# Patient Record
Sex: MALE | Race: BLACK OR AFRICAN AMERICAN | NOT HISPANIC OR LATINO | Employment: FULL TIME | URBAN - METROPOLITAN AREA
[De-identification: names, ages, dates, MRNs, and addresses within clinical notes are randomized per-mention and may not be internally consistent; named-entity substitution may affect disease eponyms.]

---

## 2022-07-06 ENCOUNTER — HOSPITAL ENCOUNTER (EMERGENCY)
Facility: HOSPITAL | Age: 22
Discharge: HOME | End: 2022-07-07
Attending: INTERNAL MEDICINE

## 2022-07-06 VITALS
HEART RATE: 66 BPM | RESPIRATION RATE: 16 BRPM | WEIGHT: 112.63 LBS | BODY MASS INDEX: 17.07 KG/M2 | TEMPERATURE: 98 F | OXYGEN SATURATION: 99 % | DIASTOLIC BLOOD PRESSURE: 74 MMHG | SYSTOLIC BLOOD PRESSURE: 118 MMHG | HEIGHT: 68 IN

## 2022-07-06 DIAGNOSIS — R52 BODY ACHES: ICD-10-CM

## 2022-07-06 DIAGNOSIS — B34.9 VIRAL ILLNESS: Primary | ICD-10-CM

## 2022-07-06 DIAGNOSIS — R11.0 NAUSEA: ICD-10-CM

## 2022-07-06 LAB
FLUAV AG UPPER RESP QL IA.RAPID: NOT DETECTED
FLUBV AG UPPER RESP QL IA.RAPID: NOT DETECTED
SARS-COV-2 RNA RESP QL NAA+PROBE: NOT DETECTED

## 2022-07-06 PROCEDURE — 99284 EMERGENCY DEPT VISIT MOD MDM: CPT | Mod: 25

## 2022-07-06 PROCEDURE — 87636 SARSCOV2 & INF A&B AMP PRB: CPT | Performed by: INTERNAL MEDICINE

## 2022-07-07 NOTE — ED PROVIDER NOTES
Encounter Date: 7/6/2022       History     Chief Complaint   Patient presents with    Generalized Body Aches     C/o body aches, headache, and diarrhea since yesterday.      22-year-old black male presents to the emergency department complaining of generalized malaise and body aches        Review of patient's allergies indicates:  No Known Allergies  No past medical history on file.  No past surgical history on file.  No family history on file.  Social History     Tobacco Use    Smoking status: Current Every Day Smoker     Types: Vaping with nicotine    Smokeless tobacco: Never Used   Substance Use Topics    Alcohol use: Never    Drug use: Never     Review of Systems   Constitutional: Positive for chills and fatigue. Negative for activity change, appetite change, diaphoresis, fever and unexpected weight change.   HENT: Negative.  Negative for congestion, dental problem, drooling, ear discharge, ear pain, facial swelling, hearing loss, mouth sores, nosebleeds, postnasal drip, rhinorrhea, sinus pressure, sinus pain, sneezing, sore throat, tinnitus, trouble swallowing and voice change.    Eyes: Negative.  Negative for photophobia, pain, discharge, redness, itching and visual disturbance.   Respiratory: Negative.  Negative for apnea, cough, choking, chest tightness, shortness of breath, wheezing and stridor.    Cardiovascular: Negative.  Negative for chest pain, palpitations and leg swelling.   Gastrointestinal: Negative.  Negative for abdominal distention, abdominal pain, anal bleeding, blood in stool, constipation, diarrhea, nausea, rectal pain and vomiting.   Endocrine: Negative.  Negative for cold intolerance, heat intolerance, polydipsia, polyphagia and polyuria.   Genitourinary: Negative.  Negative for decreased urine volume, difficulty urinating, dysuria, enuresis, flank pain, frequency, genital sores, hematuria, penile discharge, penile pain, penile swelling, scrotal swelling, testicular pain and urgency.    Musculoskeletal: Positive for myalgias. Negative for arthralgias, back pain, gait problem, joint swelling, neck pain and neck stiffness.   Skin: Negative.  Negative for color change, pallor, rash and wound.   Allergic/Immunologic: Negative.  Negative for environmental allergies, food allergies and immunocompromised state.   Neurological: Negative.  Negative for dizziness, tremors, seizures, syncope, facial asymmetry, speech difficulty, weakness, light-headedness, numbness and headaches.   Hematological: Negative.  Negative for adenopathy. Does not bruise/bleed easily.   Psychiatric/Behavioral: Negative.  Negative for agitation, behavioral problems, confusion, decreased concentration, dysphoric mood, hallucinations, self-injury, sleep disturbance and suicidal ideas. The patient is not nervous/anxious and is not hyperactive.    All other systems reviewed and are negative.      Physical Exam     Initial Vitals [07/06/22 2145]   BP Pulse Resp Temp SpO2   118/74 66 16 98.2 °F (36.8 °C) 99 %      MAP       --         Physical Exam    Nursing note and vitals reviewed.  Constitutional: He appears well-developed and well-nourished.   HENT:   Head: Normocephalic and atraumatic.   Eyes: Conjunctivae and EOM are normal. Pupils are equal, round, and reactive to light.   Neck: Neck supple.   Normal range of motion.  Cardiovascular: Normal rate and regular rhythm.   Pulmonary/Chest: Breath sounds normal.   Abdominal: Abdomen is soft. Bowel sounds are normal.   Musculoskeletal:         General: Normal range of motion.      Cervical back: Normal range of motion and neck supple.     Neurological: He is alert and oriented to person, place, and time.   Skin: Skin is warm and dry. Capillary refill takes less than 2 seconds.   Psychiatric: He has a normal mood and affect. His behavior is normal. Judgment and thought content normal.         ED Course   Procedures  Labs Reviewed   COVID/FLU A&B PCR - Normal          Imaging Results           X-Ray Abdomen Flat And Erect (Preliminary result)  Result time 07/07/22 01:18:02    ED Interpretation by Hardeep Hutchinson MD (07/07/22 01:18:02, [facility] - Emergency Dept, Emergency Medicine)    Nonspecific bowel gas pattern with no evidence of obstruction or perforation                             X-Ray Chest PA And Lateral (Preliminary result)  Result time 07/07/22 01:18:11    ED Interpretation by Hardeep Hutchinson MD (07/07/22 01:18:11, [facility] - Emergency Dept, Emergency Medicine)    No acute cardiopulmonary changes noted, no infiltrates                               Medications - No data to display                       Clinical Impression:   Final diagnoses:  [R52] Body aches  [R11.0] Nausea  [B34.9] Viral illness (Primary)          ED Disposition Condition    Discharge Stable        ED Prescriptions     None        Follow-up Information     Follow up With Specialties Details Why Contact Info    Primary care physician  In 1 week          Courtney Jo is a certified MA and was present during the entire interaction with this patient     Hardeep Hutchinson MD  07/07/22 0119

## 2023-01-18 ENCOUNTER — HOSPITAL ENCOUNTER (EMERGENCY)
Facility: HOSPITAL | Age: 23
Discharge: HOME | End: 2023-01-19
Attending: INTERNAL MEDICINE
Payer: COMMERCIAL

## 2023-01-18 DIAGNOSIS — A08.4 VIRAL GASTROENTERITIS: Primary | ICD-10-CM

## 2023-01-18 DIAGNOSIS — R11.2 NAUSEA AND VOMITING, UNSPECIFIED VOMITING TYPE: ICD-10-CM

## 2023-01-18 LAB
APPEARANCE UR: ABNORMAL
BILIRUB UR QL STRIP.AUTO: NEGATIVE MG/DL
COLOR UR AUTO: ABNORMAL
GLUCOSE UR QL STRIP.AUTO: NEGATIVE MG/DL
KETONES UR QL STRIP.AUTO: 15 MG/DL
LEUKOCYTE ESTERASE UR QL STRIP.AUTO: NEGATIVE UNIT/L
NITRITE UR QL STRIP.AUTO: NEGATIVE
PH UR STRIP.AUTO: 7 [PH]
PROT UR QL STRIP.AUTO: ABNORMAL MG/DL
RBC UR QL AUTO: NEGATIVE UNIT/L
SP GR UR STRIP.AUTO: 1.02
UROBILINOGEN UR STRIP-ACNC: 2 MG/DL

## 2023-01-18 PROCEDURE — 82150 ASSAY OF AMYLASE: CPT | Performed by: INTERNAL MEDICINE

## 2023-01-18 PROCEDURE — 81003 URINALYSIS AUTO W/O SCOPE: CPT | Performed by: INTERNAL MEDICINE

## 2023-01-18 PROCEDURE — 85025 COMPLETE CBC W/AUTO DIFF WBC: CPT | Performed by: INTERNAL MEDICINE

## 2023-01-18 PROCEDURE — 80053 COMPREHEN METABOLIC PANEL: CPT | Performed by: INTERNAL MEDICINE

## 2023-01-18 PROCEDURE — 99284 EMERGENCY DEPT VISIT MOD MDM: CPT

## 2023-01-18 PROCEDURE — 83690 ASSAY OF LIPASE: CPT | Performed by: INTERNAL MEDICINE

## 2023-01-18 PROCEDURE — 80307 DRUG TEST PRSMV CHEM ANLYZR: CPT | Performed by: INTERNAL MEDICINE

## 2023-01-18 NOTE — Clinical Note
"Lorne Hilario (Lionel) was seen and treated in our emergency department on 1/18/2023.  He may return to work on 01/20/2023.       If you have any questions or concerns, please don't hesitate to call.       RN    "

## 2023-01-18 NOTE — Clinical Note
"Lorne Hilario (Lionel) was seen and treated in our emergency department on 1/18/2023.  He may return to work on 01/19/2023.       If you have any questions or concerns, please don't hesitate to call.       RN    "

## 2023-01-19 VITALS
DIASTOLIC BLOOD PRESSURE: 76 MMHG | RESPIRATION RATE: 18 BRPM | OXYGEN SATURATION: 100 % | SYSTOLIC BLOOD PRESSURE: 135 MMHG | HEIGHT: 68 IN | WEIGHT: 113.81 LBS | TEMPERATURE: 98 F | HEART RATE: 65 BPM | BODY MASS INDEX: 17.25 KG/M2

## 2023-01-19 LAB
ALBUMIN SERPL-MCNC: 4.9 G/DL (ref 3.5–5)
ALBUMIN/GLOB SERPL: 1.2 RATIO (ref 1.1–2)
ALP SERPL-CCNC: 46 UNIT/L (ref 40–150)
ALT SERPL-CCNC: 19 UNIT/L (ref 0–55)
AMPHET UR QL SCN: NEGATIVE
AMYLASE SERPL-CCNC: 112 UNIT/L (ref 25–125)
AST SERPL-CCNC: 21 UNIT/L (ref 5–34)
BACTERIA #/AREA URNS AUTO: ABNORMAL /HPF
BARBITURATE SCN PRESENT UR: NEGATIVE
BASOPHILS # BLD AUTO: 0.07 X10(3)/MCL (ref 0–0.2)
BASOPHILS NFR BLD AUTO: 1.5 %
BENZODIAZ UR QL SCN: NEGATIVE
BILIRUBIN DIRECT+TOT PNL SERPL-MCNC: 2.1 MG/DL
BUN SERPL-MCNC: 14 MG/DL (ref 8.9–20.6)
CALCIUM SERPL-MCNC: 10 MG/DL (ref 8.4–10.2)
CANNABINOIDS UR QL SCN: POSITIVE
CHLORIDE SERPL-SCNC: 105 MMOL/L (ref 98–107)
CO2 SERPL-SCNC: 21 MMOL/L (ref 22–29)
COCAINE UR QL SCN: NEGATIVE
CREAT SERPL-MCNC: 0.91 MG/DL (ref 0.73–1.18)
EOSINOPHIL # BLD AUTO: 0.12 X10(3)/MCL (ref 0–0.9)
EOSINOPHIL NFR BLD AUTO: 2.6 %
ERYTHROCYTE [DISTWIDTH] IN BLOOD BY AUTOMATED COUNT: 12.4 % (ref 11.5–17)
FENTANYL UR QL SCN: NEGATIVE
GFR SERPLBLD CREATININE-BSD FMLA CKD-EPI: >60 MLS/MIN/1.73/M2
GLOBULIN SER-MCNC: 4 GM/DL (ref 2.4–3.5)
GLUCOSE SERPL-MCNC: 70 MG/DL (ref 74–100)
HCT VFR BLD AUTO: 50 % (ref 42–52)
HGB BLD-MCNC: 16.3 GM/DL (ref 14–18)
IMM GRANULOCYTES # BLD AUTO: 0 X10(3)/MCL (ref 0–0.04)
IMM GRANULOCYTES NFR BLD AUTO: 0 %
LIPASE SERPL-CCNC: 26 U/L
LYMPHOCYTES # BLD AUTO: 1.63 X10(3)/MCL (ref 0.6–4.6)
LYMPHOCYTES NFR BLD AUTO: 35.4 %
MCH RBC QN AUTO: 29 PG
MCHC RBC AUTO-ENTMCNC: 32.6 MG/DL (ref 33–36)
MCV RBC AUTO: 88.8 FL (ref 80–94)
MDMA UR QL SCN: NEGATIVE
MONOCYTES # BLD AUTO: 0.31 X10(3)/MCL (ref 0.1–1.3)
MONOCYTES NFR BLD AUTO: 6.7 %
MUCOUS THREADS URNS QL MICRO: ABNORMAL /LPF
NEUTROPHILS # BLD AUTO: 2.48 X10(3)/MCL (ref 2.1–9.2)
NEUTROPHILS NFR BLD AUTO: 53.8 %
OPIATES UR QL SCN: NEGATIVE
PCP UR QL: NEGATIVE
PH UR: 7 [PH] (ref 3–11)
PLATELET # BLD AUTO: 291 X10(3)/MCL (ref 130–400)
PMV BLD AUTO: 9.9 FL (ref 7.4–10.4)
POTASSIUM SERPL-SCNC: 3.8 MMOL/L (ref 3.5–5.1)
PROT SERPL-MCNC: 8.9 GM/DL (ref 6.4–8.3)
RBC # BLD AUTO: 5.63 X10(6)/MCL (ref 4.7–6.1)
RBC #/AREA URNS AUTO: ABNORMAL /HPF
SODIUM SERPL-SCNC: 137 MMOL/L (ref 136–145)
SPECIFIC GRAVITY, URINE AUTO (.000) (OHS): 1.02 (ref 1–1.03)
SQUAMOUS #/AREA URNS AUTO: ABNORMAL /HPF
WBC # SPEC AUTO: 4.6 X10(3)/MCL (ref 4.5–11.5)
WBC #/AREA URNS AUTO: ABNORMAL /HPF

## 2023-01-19 RX ORDER — ONDANSETRON 4 MG/1
4 TABLET, ORALLY DISINTEGRATING ORAL EVERY 6 HOURS PRN
Qty: 10 TABLET | Refills: 0 | Status: SHIPPED | OUTPATIENT
Start: 2023-01-19 | End: 2023-01-23

## 2023-01-19 NOTE — ED PROVIDER NOTES
Encounter Date: 1/18/2023       History     Chief Complaint   Patient presents with    Abdominal Pain     N/v/d and abdominal pain x3 days. Pt reports being unable to eat without feeling full     22-year-old black male reports a 3 days of nausea vomiting and diarrhea with generalized abdominal pain.  He denies fever or chills and denies any travel or sick contacts    Review of patient's allergies indicates:  No Known Allergies  History reviewed. No pertinent past medical history.  History reviewed. No pertinent surgical history.  History reviewed. No pertinent family history.  Social History     Tobacco Use    Smoking status: Every Day     Types: Vaping with nicotine    Smokeless tobacco: Never   Substance Use Topics    Alcohol use: Never    Drug use: Never     Review of Systems   Constitutional: Negative.  Negative for activity change, appetite change, chills, diaphoresis, fatigue, fever and unexpected weight change.   HENT: Negative.  Negative for congestion, dental problem, drooling, ear discharge, ear pain, facial swelling, hearing loss, mouth sores, nosebleeds, postnasal drip, rhinorrhea, sinus pressure, sinus pain, sneezing, sore throat, tinnitus, trouble swallowing and voice change.    Eyes: Negative.  Negative for photophobia, pain, discharge, redness, itching and visual disturbance.   Respiratory: Negative.  Negative for apnea, cough, choking, chest tightness, shortness of breath, wheezing and stridor.    Cardiovascular: Negative.  Negative for chest pain, palpitations and leg swelling.   Gastrointestinal:  Positive for diarrhea, nausea and vomiting. Negative for abdominal distention, abdominal pain, anal bleeding, blood in stool, constipation and rectal pain.   Endocrine: Negative.  Negative for cold intolerance, heat intolerance, polydipsia, polyphagia and polyuria.   Genitourinary: Negative.  Negative for decreased urine volume, difficulty urinating, dysuria, enuresis, flank pain, frequency, genital  sores, hematuria, penile discharge, penile pain, penile swelling, scrotal swelling, testicular pain and urgency.   Musculoskeletal: Negative.  Negative for arthralgias, back pain, gait problem, joint swelling, myalgias, neck pain and neck stiffness.   Skin: Negative.  Negative for color change, pallor, rash and wound.   Allergic/Immunologic: Negative.  Negative for environmental allergies, food allergies and immunocompromised state.   Neurological: Negative.  Negative for dizziness, tremors, seizures, syncope, facial asymmetry, speech difficulty, weakness, light-headedness, numbness and headaches.   Hematological: Negative.  Negative for adenopathy. Does not bruise/bleed easily.   Psychiatric/Behavioral: Negative.  Negative for agitation, behavioral problems, confusion, decreased concentration, dysphoric mood, hallucinations, self-injury, sleep disturbance and suicidal ideas. The patient is not nervous/anxious and is not hyperactive.    All other systems reviewed and are negative.    Physical Exam     Initial Vitals [01/18/23 2119]   BP Pulse Resp Temp SpO2   118/77 73 18 98.2 °F (36.8 °C) 100 %      MAP       --         Physical Exam    Nursing note and vitals reviewed.  Constitutional: He appears well-developed and well-nourished.   HENT:   Head: Normocephalic and atraumatic.   Eyes: Conjunctivae and EOM are normal. Pupils are equal, round, and reactive to light.   Neck: Neck supple.   Normal range of motion.  Cardiovascular:  Normal rate and regular rhythm.           Pulmonary/Chest: Breath sounds normal.   Abdominal: Abdomen is soft. Bowel sounds are normal.   Musculoskeletal:         General: Normal range of motion.      Cervical back: Normal range of motion and neck supple.     Neurological: He is alert and oriented to person, place, and time.   Skin: Skin is warm and dry. Capillary refill takes less than 2 seconds.   Psychiatric: He has a normal mood and affect. His behavior is normal. Judgment and thought  content normal.       ED Course   Procedures  Admission on 01/18/2023   Component Date Value Ref Range Status    Sodium Level 01/18/2023 137  136 - 145 mmol/L Final    Potassium Level 01/18/2023 3.8  3.5 - 5.1 mmol/L Final    Chloride 01/18/2023 105  98 - 107 mmol/L Final    Carbon Dioxide 01/18/2023 21 (L)  22 - 29 mmol/L Final    Glucose Level 01/18/2023 70 (L)  74 - 100 mg/dL Final    Blood Urea Nitrogen 01/18/2023 14.0  8.9 - 20.6 mg/dL Final    Creatinine 01/18/2023 0.91  0.73 - 1.18 mg/dL Final    Calcium Level Total 01/18/2023 10.0  8.4 - 10.2 mg/dL Final    Protein Total 01/18/2023 8.9 (H)  6.4 - 8.3 gm/dL Final    Albumin Level 01/18/2023 4.9  3.5 - 5.0 g/dL Final    Globulin 01/18/2023 4.0 (H)  2.4 - 3.5 gm/dL Final    Albumin/Globulin Ratio 01/18/2023 1.2  1.1 - 2.0 ratio Final    Bilirubin Total 01/18/2023 2.1 (H)  <=1.5 mg/dL Final    Alkaline Phosphatase 01/18/2023 46  40 - 150 unit/L Final    Alanine Aminotransferase 01/18/2023 19  0 - 55 unit/L Final    Aspartate Aminotransferase 01/18/2023 21  5 - 34 unit/L Final    eGFR 01/18/2023 >60  mls/min/1.73/m2 Final    Color, UA 01/18/2023 Dark Yellow  Yellow, Light-Yellow, Dark Yellow, Sandra, Straw Final    Appearance, UA 01/18/2023 Slightly Cloudy (A)  Clear Final    Specific Gravity, UA 01/18/2023 1.025   Final    pH, UA 01/18/2023 7.0  5.0 - 8.5 Final    Protein, UA 01/18/2023 Trace (A)  Negative mg/dL Final    Glucose, UA 01/18/2023 Negative  Negative, Normal mg/dL Final    Ketones, UA 01/18/2023 15 (A)  Negative mg/dL Final    Blood, UA 01/18/2023 Negative  Negative unit/L Final    Bilirubin, UA 01/18/2023 Negative  Negative mg/dL Final    Urobilinogen, UA 01/18/2023 2.0 (A)  0.2, 1.0, Normal mg/dL Final    Nitrites, UA 01/18/2023 Negative  Negative Final    Leukocyte Esterase, UA 01/18/2023 Negative  Negative unit/L Final    Amphetamines, Urine 01/18/2023 Negative  Negative Final    Barbituates, Urine 01/18/2023 Negative  Negative Final     Benzodiazepine, Urine 01/18/2023 Negative  Negative Final    Cannabinoids, Urine 01/18/2023 Positive (A)  Negative Final    Cocaine, Urine 01/18/2023 Negative  Negative Final    Fentanyl, Urine 01/18/2023 Negative  Negative Final    MDMA, Urine 01/18/2023 Negative  Negative Final    Opiates, Urine 01/18/2023 Negative  Negative Final    Phencyclidine, Urine 01/18/2023 Negative  Negative Final    pH, Urine 01/18/2023 7.0  3.0 - 11.0 Final    Specific Gravity, Urine Auto 01/18/2023 1.025  1.001 - 1.035 Final    Amylase Level 01/18/2023 112  25 - 125 unit/L Final    Lipase Level 01/18/2023 26  <=60 U/L Final    WBC 01/18/2023 4.6  4.5 - 11.5 x10(3)/mcL Final    RBC 01/18/2023 5.63  4.70 - 6.10 x10(6)/mcL Final    Hgb 01/18/2023 16.3  14.0 - 18.0 gm/dL Final    Hct 01/18/2023 50.0  42.0 - 52.0 % Final    MCV 01/18/2023 88.8  80.0 - 94.0 fL Final    MCH 01/18/2023 29.0  pg Final    MCHC 01/18/2023 32.6 (L)  33.0 - 36.0 mg/dL Final    RDW 01/18/2023 12.4  11.5 - 17.0 % Final    Platelet 01/18/2023 291  130 - 400 x10(3)/mcL Final    MPV 01/18/2023 9.9  7.4 - 10.4 fL Final    Neut % 01/18/2023 53.8  % Final    Lymph % 01/18/2023 35.4  % Final    Mono % 01/18/2023 6.7  % Final    Eos % 01/18/2023 2.6  % Final    Basophil % 01/18/2023 1.5  % Final    Lymph # 01/18/2023 1.63  0.6 - 4.6 x10(3)/mcL Final    Neut # 01/18/2023 2.48  2.1 - 9.2 x10(3)/mcL Final    Mono # 01/18/2023 0.31  0.1 - 1.3 x10(3)/mcL Final    Eos # 01/18/2023 0.12  0 - 0.9 x10(3)/mcL Final    Baso # 01/18/2023 0.07  0 - 0.2 x10(3)/mcL Final    IG# 01/18/2023 0.00  0 - 0.04 x10(3)/mcL Final    IG% 01/18/2023 0.0  % Final    Bacteria, UA 01/18/2023 None Seen  None Seen, Rare, Occasional /HPF Final    Mucous, UA 01/18/2023 Moderate (A)  None Seen /LPF Final    RBC, UA 01/18/2023 0-2  None Seen, 0-2, 3-5, 0-5 /HPF Final    WBC, UA 01/18/2023 None Seen  None Seen, 0-2, 3-5, 0-5 /HPF Final    Squamous Epithelial Cells, UA 01/18/2023 Rare  None Seen, Rare,  Occasional, Occ /HPF Final       Labs Reviewed   COMPREHENSIVE METABOLIC PANEL - Abnormal; Notable for the following components:       Result Value    Carbon Dioxide 21 (*)     Glucose Level 70 (*)     Protein Total 8.9 (*)     Globulin 4.0 (*)     Bilirubin Total 2.1 (*)     All other components within normal limits   URINALYSIS, REFLEX TO URINE CULTURE - Abnormal; Notable for the following components:    Appearance, UA Slightly Cloudy (*)     Protein, UA Trace (*)     Ketones, UA 15 (*)     Urobilinogen, UA 2.0 (*)     All other components within normal limits   DRUG SCREEN, URINE (BEAKER) - Abnormal; Notable for the following components:    Cannabinoids, Urine Positive (*)     All other components within normal limits    Narrative:     Cut off concentrations:    Amphetamines - 1000 ng/ml  Barbiturates - 200 ng/ml  Benzodiazepine - 200 ng/ml  Cannabinoids (THC) - 50 ng/ml  Cocaine - 300 ng/ml  Fentanyl - 1.0 ng/ml  MDMA - 500 ng/ml  Opiates - 300 ng/ml   Phencyclidine (PCP) - 25 ng/ml    Specimen submitted for drug analysis and tested for pH and specific gravity in order to evaluate sample integrity. Suspect tampering if specific gravity is <1.003 and/or pH is not within the range of 4.5 - 8.0  False negatives may result form substances such as bleach added to urine.  False positives may result for the presence of a substance with similar chemical structure to the drug or its metabolite.    This test provides only a PRELIMINARY analytical test result. A more specific alternate chemical method must be used in order to obtain a confirmed analytical result. Gas chromatography/mass spectrometry (GC/MS) is the preferred confirmatory method. Other chemical confirmation methods are available. Clinical consideration and professional judgement should be applied to any drug of abuse test result, particularly when preliminary positive results are used.    Positive results will be confirmed only at the physicians request.  Unconfirmed screening results are to be used only for medical purposes (treatment).        CBC WITH DIFFERENTIAL - Abnormal; Notable for the following components:    MCHC 32.6 (*)     All other components within normal limits   URINALYSIS, MICROSCOPIC - Abnormal; Notable for the following components:    Mucous, UA Moderate (*)     All other components within normal limits   AMYLASE - Normal   LIPASE - Normal   CBC W/ AUTO DIFFERENTIAL    Narrative:     The following orders were created for panel order CBC auto differential.  Procedure                               Abnormality         Status                     ---------                               -----------         ------                     CBC with Differential[[id number]]        Abnormal            Final result                 Please view results for these tests on the individual orders.          Imaging Results              X-Ray Abdomen Flat And Erect (Preliminary result)  Result time 01/19/23 00:45:11      ED Interpretation by Hardeep Hutchinson MD (01/19/23 00:45:11, [facility] - Emergency Dept, Emergency Medicine)    Nonspecific bowel gas pattern with no evidence of obstruction or perforation                                     Medications - No data to display                    Medical Decision Making  22-year-old black male with no past medical history presents emergency department with 3 days of nausea vomiting and diarrhea.  He states the abdominal pain is intermittent in character and generalized around his periumbilical area.  He denies any fever or sick contacts or recent travel.  He is currently not nauseated but states at times he will get waves of nausea that will lead to him dry heaving.  Workup was done which revealed normal lab work and a normal x-ray and his clinical picture is consistent with a viral gastroenteritis so I reassured him he is not toxic looking in his afebrile and will send a script for Zofran to the pharmacy for  him    Problems Addressed:  Nausea and vomiting, unspecified vomiting type: acute illness or injury  Viral gastroenteritis: acute illness or injury    Amount and/or Complexity of Data Reviewed  Labs: ordered. Decision-making details documented in ED Course.  Radiology: ordered and independent interpretation performed. Decision-making details documented in ED Course.    Risk  OTC drugs.  Prescription drug management.  Parenteral controlled substances.  Decision regarding hospitalization.  Diagnosis or treatment significantly limited by social determinants of health.            Clinical Impression:   Final diagnoses:  [A08.4] Viral gastroenteritis (Primary)  [R11.2] Nausea and vomiting, unspecified vomiting type        ED Disposition Condition    Discharge Stable          ED Prescriptions       Medication Sig Dispense Start Date End Date Auth. Provider    ondansetron (ZOFRAN-ODT) 4 MG TbDL Take 1 tablet (4 mg total) by mouth every 6 (six) hours as needed (Nausea). 10 tablet 1/19/2023 1/23/2023 Hardeep Hutchinson MD          Follow-up Information       Follow up With Specialties Details Why Contact Info    Primary care physician  In 3 days            Courtney Jo is a certified MA and was present during the entire interaction with this patient      Hardeep Hutchinson MD  01/19/23 0045

## 2023-01-20 DIAGNOSIS — Z00.00 WELLNESS EXAMINATION: Primary | ICD-10-CM

## 2023-07-05 ENCOUNTER — PATIENT MESSAGE (OUTPATIENT)
Dept: RESEARCH | Facility: HOSPITAL | Age: 23
End: 2023-07-05
Payer: COMMERCIAL

## 2023-10-02 ENCOUNTER — HOSPITAL ENCOUNTER (EMERGENCY)
Facility: HOSPITAL | Age: 23
Discharge: HOME | End: 2023-10-02
Attending: INTERNAL MEDICINE
Payer: COMMERCIAL

## 2023-10-02 VITALS
TEMPERATURE: 98 F | BODY MASS INDEX: 16.88 KG/M2 | DIASTOLIC BLOOD PRESSURE: 72 MMHG | OXYGEN SATURATION: 99 % | RESPIRATION RATE: 20 BRPM | HEART RATE: 70 BPM | SYSTOLIC BLOOD PRESSURE: 111 MMHG | WEIGHT: 111 LBS

## 2023-10-02 DIAGNOSIS — A08.4 VIRAL GASTROENTERITIS: Primary | ICD-10-CM

## 2023-10-02 DIAGNOSIS — R51.9 NONINTRACTABLE HEADACHE, UNSPECIFIED CHRONICITY PATTERN, UNSPECIFIED HEADACHE TYPE: ICD-10-CM

## 2023-10-02 PROCEDURE — 25000003 PHARM REV CODE 250: Performed by: NURSE PRACTITIONER

## 2023-10-02 PROCEDURE — 63600175 PHARM REV CODE 636 W HCPCS: Performed by: NURSE PRACTITIONER

## 2023-10-02 PROCEDURE — 99284 EMERGENCY DEPT VISIT MOD MDM: CPT

## 2023-10-02 PROCEDURE — 96372 THER/PROPH/DIAG INJ SC/IM: CPT | Performed by: NURSE PRACTITIONER

## 2023-10-02 PROCEDURE — 0240U COVID/FLU A&B PCR: CPT | Performed by: NURSE PRACTITIONER

## 2023-10-02 RX ORDER — ONDANSETRON 4 MG/1
4 TABLET, ORALLY DISINTEGRATING ORAL
Status: COMPLETED | OUTPATIENT
Start: 2023-10-02 | End: 2023-10-02

## 2023-10-02 RX ORDER — ONDANSETRON 4 MG/1
4 TABLET, ORALLY DISINTEGRATING ORAL EVERY 6 HOURS PRN
Qty: 20 TABLET | Refills: 0 | Status: SHIPPED | OUTPATIENT
Start: 2023-10-02 | End: 2023-10-07

## 2023-10-02 RX ORDER — KETOROLAC TROMETHAMINE 30 MG/ML
30 INJECTION, SOLUTION INTRAMUSCULAR; INTRAVENOUS
Status: COMPLETED | OUTPATIENT
Start: 2023-10-02 | End: 2023-10-02

## 2023-10-02 RX ORDER — IBUPROFEN 600 MG/1
600 TABLET ORAL
Status: DISCONTINUED | OUTPATIENT
Start: 2023-10-02 | End: 2023-10-02

## 2023-10-02 RX ADMIN — KETOROLAC TROMETHAMINE 30 MG: 30 INJECTION, SOLUTION INTRAMUSCULAR; INTRAVENOUS at 07:10

## 2023-10-02 RX ADMIN — ONDANSETRON 4 MG: 4 TABLET, ORALLY DISINTEGRATING ORAL at 07:10

## 2023-10-02 NOTE — Clinical Note
"Lorne Hilario (Lionel) was seen and treated in our emergency department on 10/2/2023.  He may return to work on 10/04/2023.       If you have any questions or concerns, please don't hesitate to call.       RN    "

## 2023-10-03 NOTE — ED PROVIDER NOTES
Encounter Date: 10/2/2023       History     Chief Complaint   Patient presents with    Headache     States began yesterday with L sided headache, nausea, and abdominal cramping. No meds taken PTA     See MDM    The history is provided by the patient. No  was used.     Review of patient's allergies indicates:  No Known Allergies  No past medical history on file.  No past surgical history on file.  No family history on file.  Social History     Tobacco Use    Smoking status: Every Day     Types: Vaping with nicotine    Smokeless tobacco: Never   Substance Use Topics    Alcohol use: Never    Drug use: Never     Review of Systems   Respiratory:  Positive for cough.    Gastrointestinal:  Positive for abdominal pain, diarrhea and nausea.   Neurological:  Positive for headaches.   All other systems reviewed and are negative.      Physical Exam     Initial Vitals [10/02/23 1848]   BP Pulse Resp Temp SpO2   118/78 77 16 98 °F (36.7 °C) 99 %      MAP       --         Physical Exam    Nursing note and vitals reviewed.  Constitutional: He appears well-developed and well-nourished.   HENT:   Right Ear: Tympanic membrane and ear canal normal.   Left Ear: Tympanic membrane and ear canal normal.   Mouth/Throat: No posterior oropharyngeal edema or posterior oropharyngeal erythema.   Eyes: Conjunctivae are normal.   Cardiovascular:  Normal rate, regular rhythm and normal heart sounds.           Pulmonary/Chest: Breath sounds normal. No respiratory distress.   Abdominal: Abdomen is soft. Bowel sounds are normal. He exhibits no distension. There is no abdominal tenderness.   Musculoskeletal:         General: Normal range of motion.     Neurological: He is alert and oriented to person, place, and time.   Skin: Skin is warm and dry.   Psychiatric: He has a normal mood and affect.         ED Course   Procedures  Labs Reviewed   COVID/FLU A&B PCR - Normal    Narrative:     The Xpert Xpress SARS-CoV-2/FLU/RSV plus is a  rapid, multiplexed real-time PCR test intended for the simultaneous qualitative detection and differentiation of SARS-CoV-2, Influenza A, Influenza B, and respiratory syncytial virus (RSV) viral RNA in either nasopharyngeal swab or nasal swab specimens.                Imaging Results    None          Medications   ondansetron disintegrating tablet 4 mg (4 mg Oral Given 10/2/23 1907)   ketorolac injection 30 mg (30 mg Intramuscular Given 10/2/23 1907)     Medical Decision Making  24 y/o male presents with cough, headache, abdominal cramping with nausea and 5 episodes of diarrhea today. No fever. No known sick contacts.     Covid/flu negative. Toradol given and helping he states headache and abdominal cramping improved. Discussed likely viral. Will give nausea meds and let him go home. [place] diet. Hydrate.     Amount and/or Complexity of Data Reviewed  Labs: ordered. Decision-making details documented in ED Course.    Risk  Prescription drug management.      Additional MDM:   Differential Diagnosis:   Other: The following diagnoses were also considered and will be evaluated: covid, gastroenteritis and flu.                            Clinical Impression:   Final diagnoses:  [A08.4] Viral gastroenteritis (Primary)  [R51.9] Nonintractable headache, unspecified chronicity pattern, unspecified headache type        ED Disposition Condition    Discharge Stable          ED Prescriptions       Medication Sig Dispense Start Date End Date Auth. Provider    ondansetron (ZOFRAN-ODT) 4 MG TbDL Take 1 tablet (4 mg total) by mouth every 6 (six) hours as needed (nausea/vomiting). 20 tablet 10/2/2023 10/7/2023 Estella Peralta FNP          Follow-up Information       Follow up With Specialties Details Why Contact Info    primary care provider  Call in 1 week As needed, If symptoms worsen              Estella Peralta FNP  10/02/23 2006

## 2023-11-13 ENCOUNTER — HOSPITAL ENCOUNTER (EMERGENCY)
Facility: HOSPITAL | Age: 23
Discharge: HOME | End: 2023-11-13
Attending: EMERGENCY MEDICINE
Payer: COMMERCIAL

## 2023-11-13 VITALS
HEART RATE: 82 BPM | TEMPERATURE: 98 F | SYSTOLIC BLOOD PRESSURE: 130 MMHG | RESPIRATION RATE: 18 BRPM | DIASTOLIC BLOOD PRESSURE: 79 MMHG | WEIGHT: 118.19 LBS | OXYGEN SATURATION: 99 % | BODY MASS INDEX: 17.97 KG/M2

## 2023-11-13 DIAGNOSIS — T78.1XXA ALLERGIC REACTION TO FOOD, INITIAL ENCOUNTER: Primary | ICD-10-CM

## 2023-11-13 PROCEDURE — 99284 EMERGENCY DEPT VISIT MOD MDM: CPT

## 2023-11-13 PROCEDURE — 96372 THER/PROPH/DIAG INJ SC/IM: CPT | Performed by: NURSE PRACTITIONER

## 2023-11-13 PROCEDURE — 63600175 PHARM REV CODE 636 W HCPCS: Performed by: NURSE PRACTITIONER

## 2023-11-13 RX ORDER — LEVOCETIRIZINE DIHYDROCHLORIDE 5 MG/1
5 TABLET, FILM COATED ORAL NIGHTLY
Qty: 30 TABLET | Refills: 0 | Status: SHIPPED | OUTPATIENT
Start: 2023-11-13 | End: 2023-12-13

## 2023-11-13 RX ORDER — EPINEPHRINE 0.3 MG/.3ML
1 INJECTION SUBCUTANEOUS ONCE
Qty: 0.3 ML | Refills: 0 | Status: SHIPPED | OUTPATIENT
Start: 2023-11-13 | End: 2023-11-13

## 2023-11-13 RX ORDER — DIPHENHYDRAMINE HYDROCHLORIDE 50 MG/ML
25 INJECTION INTRAMUSCULAR; INTRAVENOUS
Status: COMPLETED | OUTPATIENT
Start: 2023-11-13 | End: 2023-11-13

## 2023-11-13 RX ADMIN — DIPHENHYDRAMINE HYDROCHLORIDE 25 MG: 50 INJECTION INTRAMUSCULAR; INTRAVENOUS at 07:11

## 2023-11-13 NOTE — Clinical Note
"Lorne Hilario (Lionel) was seen and treated in our emergency department on 11/13/2023.  He may return to work on 11/14/2023.       If you have any questions or concerns, please don't hesitate to call.       RN    "

## 2023-11-14 NOTE — ED PROVIDER NOTES
Encounter Date: 11/13/2023       History     Chief Complaint   Patient presents with    Allergic Reaction     Possible allergic reaction to strawberry, pt reports eating strawberries 30 minutes pta, no feels like his throat is closing. Denies difficulty breathing. No meds taken     Patient is a 23-year-old male who presents emergency department with itching and tingling to the lips as well as an itching feeling to the posterior throat and feeling like it is swelling.  He states this started 30 minutes after eating strawberries and denies any other contact with any foods or other products that could have caused reaction.  He denies any hoarse voice choking difficulty swallowing and is in no distress at this time.  His significant other is at the bedside and I asked her for his lips look swollen her and she said no they limp this same and at this time is there is not swollen he has no difficulty breathing.  He denies any other complaints associated symptoms at this time.      Review of patient's allergies indicates:  No Known Allergies  History reviewed. No pertinent past medical history.  History reviewed. No pertinent surgical history.  History reviewed. No pertinent family history.  Social History     Tobacco Use    Smoking status: Every Day     Types: Vaping with nicotine    Smokeless tobacco: Never   Substance Use Topics    Alcohol use: Never    Drug use: Never     Review of Systems   Constitutional:  Negative for activity change, appetite change and fever.   HENT:  Negative for congestion, dental problem and sore throat.    Eyes:  Negative for discharge and itching.   Respiratory:  Negative for apnea, chest tightness and shortness of breath.    Cardiovascular:  Negative for chest pain.   Gastrointestinal:  Negative for abdominal distention, abdominal pain and nausea.   Endocrine: Negative for cold intolerance and heat intolerance.   Genitourinary:  Negative for decreased urine volume, dysuria, testicular pain  and urgency.   Musculoskeletal:  Negative for back pain.   Skin:  Negative for rash.   Neurological:  Negative for dizziness, facial asymmetry and weakness.   Hematological:  Does not bruise/bleed easily.   Psychiatric/Behavioral:  Negative for agitation and behavioral problems.    All other systems reviewed and are negative.      Physical Exam     Initial Vitals [11/13/23 1859]   BP Pulse Resp Temp SpO2   130/79 82 18 98.4 °F (36.9 °C) 99 %      MAP       --         Physical Exam    Nursing note and vitals reviewed.  Constitutional: Vital signs are normal. He appears well-developed and well-nourished.  Non-toxic appearance. He does not have a sickly appearance.   HENT:   Head: Normocephalic and atraumatic.   Right Ear: External ear normal.   Left Ear: External ear normal.   There is no major edema.  There is mild erythema with what looks like to be some tonsillar on the right tonsil.   Eyes: Conjunctivae, EOM and lids are normal. Pupils are equal, round, and reactive to light. Lids are everted and swept, no foreign bodies found.   Neck: Trachea normal and phonation normal. Neck supple. No thyroid mass and no thyromegaly present.   Normal range of motion.   Full passive range of motion without pain.     Cardiovascular:  Normal rate, regular rhythm, S1 normal, S2 normal, normal heart sounds, intact distal pulses and normal pulses.           Pulmonary/Chest: Breath sounds normal. No respiratory distress.   Musculoskeletal:      Cervical back: Full passive range of motion without pain, normal range of motion and neck supple.     Lymphadenopathy:     He has no cervical adenopathy.   Neurological: He is alert and oriented to person, place, and time. He has normal strength. GCS score is 15. GCS eye subscore is 4. GCS verbal subscore is 5. GCS motor subscore is 6.   Skin: Skin is warm, dry and intact. Capillary refill takes less than 2 seconds. No rash noted. No erythema.   Psychiatric: He has a normal mood and affect. His  speech is normal and behavior is normal. Judgment normal. Cognition and memory are normal.         ED Course   Procedures  Labs Reviewed - No data to display       Imaging Results    None          Medications   diphenhydrAMINE injection 25 mg (25 mg Intramuscular Given 11/13/23 1919)     Medical Decision Making  Patient is a 23-year-old male who presents emergency department with itching and tingling to the lips as well as an itching feeling to the posterior throat and feeling like it is swelling.  He states this started 30 minutes after eating strawberries and denies any other contact with any foods or other products that could have caused reaction.  He denies any hoarse voice choking difficulty swallowing and is in no distress at this time.  His significant other is at the bedside and I asked her for his lips look swollen her and she said no they limp this same and at this time is there is not swollen he has no difficulty breathing.  He denies any other complaints associated symptoms at this time.        Problems Addressed:  Allergic reaction to food, initial encounter: acute illness or injury     Details: Patient is had minimal improvement Benadryl here.  Will send home with antihistamines.  EpiPen sent for possible future allergic reaction strawberries.  Encouraged patient follow up with PCP to get allergy testing.  Strict ED return precautions discussed for any change or worsening symptoms.    Amount and/or Complexity of Data Reviewed  Independent Historian: parent  External Data Reviewed: labs, radiology and notes.    Risk  Prescription drug management.               ED Course as of 11/13/23 2011 Mon Nov 13, 2023 1915 His throat on Riley's leuks more infectious to me but he assures me that he had no problems with his throat prior to eating the strawberries and denies fevers chills or any other symptoms.  Will give Benadryl and re-evaluate. [SL]   2003 Patient states it feels a little bit better but he still  has a itchy scratchy throat.  He denies any other complaints.  Denies any new symptoms since initial assessment.   [SL]      ED Course User Index  [SL] Alex Conti FNP                      Clinical Impression:   Final diagnoses:  [T78.1XXA] Allergic reaction to food, initial encounter (Primary)        ED Disposition Condition    Discharge Stable          ED Prescriptions       Medication Sig Dispense Start Date End Date Auth. Provider    levocetirizine (XYZAL) 5 MG tablet Take 1 tablet (5 mg total) by mouth every evening. 30 tablet 11/13/2023 12/13/2023 Alex Conti FNP    EPINEPHrine (EPIPEN) 0.3 mg/0.3 mL AtIn (Expires today) Inject 0.3 mLs (0.3 mg total) into the muscle once. for 1 dose 0.3 mL 11/13/2023 11/13/2023 Alex Conti FNP          Follow-up Information    None          Alex Conti FNP  11/13/23 2011

## 2024-02-23 ENCOUNTER — HOSPITAL ENCOUNTER (EMERGENCY)
Facility: HOSPITAL | Age: 24
Discharge: HOME | End: 2024-02-23
Attending: INTERNAL MEDICINE
Payer: COMMERCIAL

## 2024-02-23 VITALS
OXYGEN SATURATION: 100 % | SYSTOLIC BLOOD PRESSURE: 118 MMHG | BODY MASS INDEX: 17.38 KG/M2 | TEMPERATURE: 98 F | WEIGHT: 117.38 LBS | DIASTOLIC BLOOD PRESSURE: 67 MMHG | HEART RATE: 72 BPM | RESPIRATION RATE: 20 BRPM | HEIGHT: 69 IN

## 2024-02-23 DIAGNOSIS — S05.01XA ABRASION OF RIGHT CORNEA, INITIAL ENCOUNTER: Primary | ICD-10-CM

## 2024-02-23 PROCEDURE — 25000003 PHARM REV CODE 250: Performed by: INTERNAL MEDICINE

## 2024-02-23 PROCEDURE — 99283 EMERGENCY DEPT VISIT LOW MDM: CPT

## 2024-02-23 RX ORDER — TETRACAINE HYDROCHLORIDE 5 MG/ML
2 SOLUTION OPHTHALMIC
Status: COMPLETED | OUTPATIENT
Start: 2024-02-23 | End: 2024-02-23

## 2024-02-23 RX ADMIN — FLUORESCEIN SODIUM 1 EACH: 1 STRIP OPHTHALMIC at 03:02

## 2024-02-23 RX ADMIN — TETRACAINE HYDROCHLORIDE 2 DROP: 5 SOLUTION OPHTHALMIC at 03:02

## 2024-02-23 NOTE — DISCHARGE INSTRUCTIONS
Must see an eye doctor as soon as possible for reevaluation of the eyes in detail    See your family doctor in one to 2 days for further evaluation, workup, and treatment as necessary    Avoid driving or operating machinery while taking medicines as some medicines might cause drowsiness and may cause problems. Also pain medicines have potential of being addictive  so use Pain meds specially Narcotics Sparingly.    The exam and treatment you received in Emergency Room was for an urgent problem and NOT INTENDED AS COMPLETE CARE. It is important that you FOLLOW UP with a doctor for ongoing care. If your symptoms become WORSE or you DO NOT IMPROVE and you are unable to reach your health care provider, you should RETURN to the emergency department. The Emergency Room doctor has provided a PRELIMINARY INTERPRETATION of all your STUDIES. A final interpretation may be done after you are discharged. IF A CHANGE in your diagnosis or treatment is needed WE WILL CONTACT YOU. It is critical that we have a CURRENT PHONE NUMBER FOR YOU.

## 2024-02-23 NOTE — ED PROVIDER NOTES
Encounter Date: 2/23/2024  History from patient     History     Chief Complaint   Patient presents with    Eye Problem     Patient c/o right eye irritation since Wednesday night.  Not sure if he got something in it.     ISABELL Hilario Jr. is 23 y.o. male who  has no past medical history on file. arrives in ER with c/o Eye Problem (Patient c/o right eye irritation since Wednesday night.  Not sure if he got something in it.)    Review of patient's allergies indicates:  No Known Allergies  History reviewed. No pertinent past medical history.  History reviewed. No pertinent surgical history.  History reviewed. No pertinent family history.  Social History     Tobacco Use    Smoking status: Every Day     Types: Vaping with nicotine    Smokeless tobacco: Never   Substance Use Topics    Alcohol use: Never    Drug use: Never     Review of Systems   Constitutional:  Negative for fever.   HENT:  Negative for trouble swallowing and voice change.    Eyes:  Positive for redness and itching. Negative for discharge and visual disturbance.   Respiratory:  Negative for cough and shortness of breath.    Cardiovascular:  Negative for chest pain.   Gastrointestinal:  Negative for abdominal pain, diarrhea and vomiting.   Genitourinary:  Negative for dysuria and hematuria.   Musculoskeletal:  Negative for back pain and gait problem.   Skin:  Negative for color change and rash.   Neurological:  Negative for headaches.   Psychiatric/Behavioral:  Negative for behavioral problems and sleep disturbance.    All other systems reviewed and are negative.      Physical Exam     Initial Vitals [02/23/24 0313]   BP Pulse Resp Temp SpO2   124/68 77 18 97.9 °F (36.6 °C) 99 %      MAP       --         Physical Exam    Nursing note and vitals reviewed.  Constitutional: He appears well-developed. No distress.   HENT:   Head: Atraumatic.   Eyes: EOM are normal. Pupils are equal, round, and reactive to light.   Eye:    External: Symmetric  Bilaterally  Periorbital: No Swelling, No Ecchymosis, No Bruising, No Edema  Lids: Normal, minimal swelling right upper eyelid, No Stye, No Entropion or Ectropion, Eye Lashes Normal. Everted for Exam  Sclera: No Subconjunctival Hemorrhage, No edema, Normal Color  Conjunctiva:  Right injected, Left Normal and Clear, No Erythema, No chemosis, No edema,   Cornea: Clear, has 1 almost pinpoint uptake of fluorescein on the right cornea, No Haziness, almost pinpoint Abrasion, positive Fluorecin uptake,   Anterior Chamber: Clear, No Hyphema, no Hypopyon  Pupil: Round, Regular and Reacting to Light and Accomodation, No Asymmetry  Lens: Clear, No Cataract  Fundus Glow: Normal Bilaterally, no hemorrhage on the fundus.         Cardiovascular:  Normal rate and normal heart sounds.           Pulmonary/Chest: Breath sounds normal.   Abdominal: Abdomen is soft.   Musculoskeletal:         General: Normal range of motion.      Cervical back: No bony tenderness.     Neurological: He is alert.   Speech Normal   Skin: Skin is dry.   Psychiatric: He has a normal mood and affect.   Pleasant         ED Course   Procedures  Labs Reviewed - No data to display       Imaging Results    None          Medications   TETRAcaine HCl (PF) 0.5 % Drop 2 drop (2 drops Right Eye Given 2/23/24 0341)   fluorescein ophthalmic strip 1 each (1 each Right Eye Given 2/23/24 0341)     Medical Decision Making    Lorne Hilario  is 23 y.o. male who  has no past medical history on file. arrives in ER with c/o Eye Problem (Patient c/o right eye irritation since Wednesday night.  Not sure if he got something in it.)    According to patient he was hugging his girlfriend and he felt some irritation in the right eye, he found that he has a here in his right eye, he picked it up, but since then he has been irritated in the right eye, it turned red, has been going on for 48 hours now, so he decided to come to the emergency room to get checked.  Denies any visual  abnormality.  Just let noted of the right eye and minimal pain now which has improved.    Risk  Prescription drug management.               ED Course as of 02/23/24 0355 Fri Feb 23, 2024 0353 Patient has a almost pinpoint abrasion of the right cornea, rest of the examination is normal except for erythema of the right conjunctiva, I will let him go home and I have advised him that he must go and see an eye doctor for detailed evaluation of his eye, but I do not see any acute abnormality. [GQ]      ED Course User Index  [GQ] Jerilyn Dhillon MD                           Clinical Impression:  Final diagnoses:  [S05.01XA] Abrasion of right cornea, initial encounter (Primary)          ED Disposition Condition    Discharge Stable          ED Prescriptions    None       Follow-up Information       Follow up With Specialties Details Why Contact Info    PMD  In 2 days               Jerilyn Dhillon MD  02/23/24 0355